# Patient Record
Sex: MALE | Race: WHITE | NOT HISPANIC OR LATINO | Employment: FULL TIME | URBAN - METROPOLITAN AREA
[De-identification: names, ages, dates, MRNs, and addresses within clinical notes are randomized per-mention and may not be internally consistent; named-entity substitution may affect disease eponyms.]

---

## 2018-12-04 ENCOUNTER — OFFICE VISIT CONVERTED (OUTPATIENT)
Dept: FAMILY MEDICINE CLINIC | Facility: CLINIC | Age: 18
End: 2018-12-04
Attending: NURSE PRACTITIONER

## 2019-04-08 ENCOUNTER — HOSPITAL ENCOUNTER (OUTPATIENT)
Dept: URGENT CARE | Facility: CLINIC | Age: 19
Discharge: HOME | End: 2019-04-08

## 2020-03-03 ENCOUNTER — OFFICE VISIT CONVERTED (OUTPATIENT)
Dept: FAMILY MEDICINE CLINIC | Facility: CLINIC | Age: 20
End: 2020-03-03
Attending: NURSE PRACTITIONER

## 2020-07-20 ENCOUNTER — CONVERSION ENCOUNTER (OUTPATIENT)
Dept: FAMILY MEDICINE CLINIC | Facility: CLINIC | Age: 20
End: 2020-07-20

## 2020-07-20 ENCOUNTER — OFFICE VISIT CONVERTED (OUTPATIENT)
Dept: FAMILY MEDICINE CLINIC | Facility: CLINIC | Age: 20
End: 2020-07-20
Attending: NURSE PRACTITIONER

## 2020-08-10 ENCOUNTER — OFFICE VISIT CONVERTED (OUTPATIENT)
Dept: GASTROENTEROLOGY | Facility: CLINIC | Age: 20
End: 2020-08-10
Attending: NURSE PRACTITIONER

## 2020-08-27 ENCOUNTER — HOSPITAL ENCOUNTER (OUTPATIENT)
Dept: PREADMISSION TESTING | Facility: HOSPITAL | Age: 20
Discharge: HOME | End: 2020-08-27
Attending: INTERNAL MEDICINE

## 2020-08-28 LAB — SARS-COV-2 RNA SPEC QL NAA+PROBE: NOT DETECTED

## 2020-08-31 ENCOUNTER — HOSPITAL ENCOUNTER (OUTPATIENT)
Dept: GASTROENTEROLOGY | Facility: HOSPITAL | Age: 20
Setting detail: HOSPITAL OUTPATIENT SURGERY
Discharge: HOME | End: 2020-08-31
Attending: INTERNAL MEDICINE

## 2020-12-29 ENCOUNTER — OFFICE VISIT CONVERTED (OUTPATIENT)
Dept: FAMILY MEDICINE CLINIC | Facility: CLINIC | Age: 20
End: 2020-12-29
Attending: NURSE PRACTITIONER

## 2021-05-13 NOTE — PROGRESS NOTES
Progress Note      Patient Name: Alfonso Randolph II   Patient ID: [id number]   Sex: Male   YOB: 2000    Primary Care Provider: Alec CASTRO    Visit Date: July 20, 2020    Provider: MATTHEW Bowman   Location: [facility]   Location Address: [address] [place] [address], [address]  [id number]   Location Phone: [phone]          Chief Complaint  · Follow up office visit within 7 calendar days of discharge from inpatient status (high complexity).  · [facility] - 7/10/20-7/13/20, colitis  · denies nausea, vomiting, rectal bleeding since released home  · slight abdominal cramping, bowel movements returning to normal  · resumed work over weekend  · Dr. Aly 8/10/20      History Of Present Illness  FOLLOW UP OFFICE VISIT WITHIN 7 CALENDAR DAYS OF INPATIENT STATUS (SEVERE COMPLEXITY)  Alfonso Randolph II presents to office for follow up post discharge from inpatient status within 7 calendar days. Patient was contacted within 2 business days via phone conversation. Documentation of that phone contact is present in the patient's electronic chart. Patient was admitted to an inpatient faciliity on 07/10/2020 and discharged on 07/13/2020 due to: lower GI bleed, colitis   Admitting MD: Maxim Roberts MD   His discharge summary has been reviewed and placed in the patient's electronic chart.   Patient's problem list is: Right dorsal wrist ganglion   Patient's outpatient medication list has been reconciled with the medication list from the discharge summary and has been reviewed with the patient. Current medication list is: Carafate 1 gram oral tablet   Alfonso Randolph II is a 20 year old /White male who presents for evaluation and treatment of:       Past Medical History  Disease Name Date Onset Notes   Reflux --  --    Right dorsal wrist ganglion 09/15/2017 --    Seasonal allergies --  --          Past Surgical History  Procedure Name Date Notes   I have had no surgeries --  --           Medication List  Name Date Started Instructions   Carafate 1 gram oral tablet 07/10/2020 take 1 tablet (1 gram) by oral route 4 times per day on an empty stomach 1 hour before meals and at bedtime         Allergy List  Allergen Name Date Reaction Notes   amoxicillin --  --  --    nystatin --  --  --    PENICILLINS --  --  --          Family Medical History  Disease Name Relative/Age Notes   Stroke  --    Heart Disease  --    Diabetes Mellitus, Type II  --    Hodgskin's Lymphoma  --    *No Known Family History  --          Social History  Finding Status Start/Stop Quantity Notes   Alcohol Never --/-- --  --    Alcohol Use Never --/-- --  does not drink   lives with parents --  --/-- --  --    Recreational Drug Use Never --/-- --  no   Single. --  --/-- --  --    Tobacco Current every day --/-- 0.5 PPD current every day smoker, 0.5 pack per day, smoked 1 year   Working --  --/-- --  --          Review of Systems  · Constitutional  o Denies  o : fatigue, night sweats  · Eyes  o Denies  o : double vision, blurred vision  · HENT  o Denies  o : vertigo, recent head injury  · Breasts  o Denies  o : abnormal changes in breast size, additional breast symptoms except as noted in the HPI  · Cardiovascular  o Denies  o : chest pain, irregular heart beats  · Respiratory  o Denies  o : shortness of breath, productive cough  · Gastrointestinal  o Admits  o : feels good, no signs of blood eating fine. has appt with gastro aug 10  o Denies  o : nausea, vomiting,constipation, diarrhea  · Genitourinary  o Denies  o : dysuria, urinary retention  · Integument  o Denies  o : hair growth change, new skin lesions  · Neurologic  o Denies  o : altered mental status, seizures  · Musculoskeletal  o Admits  o : had a bump R wrist but having no probs  o Denies  o : joint swelling, limitation of motion  · Endocrine  o Denies  o : cold intolerance, heat intolerance  · Heme-Lymph  o Denies  o : petechiae, lymph node enlargement or  tenderness  · Allergic-Immunologic  o Denies  o : frequent illnesses      Vitals  Date Time BP Position Site L\R Cuff Size HR RR TEMP (F) WT  HT  BMI kg/m2 BSA m2 O2 Sat HC       07/20/2020 10:08 /64 Sitting    96 - R  98.1 157lbs 2oz 6'   21.31 1.9 98 %          Physical Examination  · Constitutional  o Appearance  o : well-nourished, well developed, alert, in no acute distress  · Head and Face  o Face  o :   § Inspection  § : no facial lesions  · Eyes  o Conjunctivae  o : conjunctivae normal  o Sclerae  o : sclerae white  o Pupils and Irises  o : pupils equal, round, and reactive to light and accommodation bilaterally  o Eyelids/Ocular Adnexae  o : eyelid appearance normal, no exudates present, eye moisture level normal  · Ears, Nose, Mouth and Throat  o Ears  o : external ear auricle normal, otic canal normal, TM with no reddness, effusion, retraction  o Nose  o : external normal, nasal mucosa normal, turbinates normal  o Oral Cavity  o : tongue no lesion, oral mucosa normal  o Throat  o : no erythemia, exudate or lesions  · Neck  o Inspection/Palpation  o : normal appearance, no masses or tenderness, trachea midline, no enlarged cervical or supraclavicular lymphnodes palpated  o Thyroid  o : gland size normal, nontender, no nodules or masses present on palpation, thyroid motion normal during swallowing  · Respiratory  o Respiratory Effort  o : breathing unlabored  o Auscultation of Lungs  o : normal breath sounds throughout  · Cardiovascular  o Heart  o :   § Auscultation of Heart  § : regular rate and rhythm without murmur  o Peripheral Vascular System  o :   § Extremities  § : no edema, no cyanosis, no distal hair loss, normal capillary refill  · Gastrointestinal  o Abdominal Examination  o : abdomen nontender to palpation, normal bowel sounds, tone normal without rigidity or guarding, no masses present, abdomen scaphoid upon supine  · Skin and Subcutaneous Tissue  o General Inspection  o : no rashes or  lesions present, no areas of discoloration  · Neurologic  o Mental Status Examination  o : judgement, insight intact, modd and affect appropriate  o Gait and Station  o : normal gait, able to stand without difficulty          Assessment  · Colitis     558.9/K52.9      Plan  · Orders  o Discharge medications reconciled with the current medication list (1111F) - - 07/20/2020  o ACO-39: Current medications updated and reviewed () - - 07/20/2020  · Medications  o Medications have been Reconciled  o Transition of Care or Provider Policy  · Instructions  o Patient discharge summary has been reviewed and placed in patient's electronic medical record.  o Patient received a phone call from my office within 2 business days of discharge from inpatient status, and documented within the patient's chart.  o Also patient was seen (face to face) for follow up evaluation within 7 calendar days of discharge from inpatient status for a high complexity issue.  o Patient was educated on their diagnosis, treatment and any medication changes while being evaluated today.  o Patient was educated/instructed on their diagnosis, treatment and medications prior to discharge from the clinic today.  · Disposition  o Call or Return if symptoms worsen or persist.            Electronically Signed by: Hilda Alcala, APRN -Author on July 20, 2020 10:35:01 AM

## 2021-05-13 NOTE — PROGRESS NOTES
Progress Note      Patient Name: Alfonso Randolph II   Patient ID: [id number]   Sex: Male   YOB: 2000    Primary Care Provider: Alec CASTRO    Visit Date: August 10, 2020    Provider: MATTHEW Velarde   Location: [facility]   Location Address: [address] [place] [address]  [place]ieta  [id number]   Location Phone: [phone]          Chief Complaint  · [facility] inpatient f/u      History Of Present Illness     20-year-old male is following up after being seen as an inpatient consult at [facility] for acute colitis on 07/11/2020. His stool studies were negative.  His hemoglobin was 13.7 on discharge.  His LFTs were not elevated during his admission.  Patient states since his discharge he has been feeling well and that he feels better than before his admission.  He is having 2-3 bowel movements a day that are soft but not formed.  He denies having rectal bleeding.  He denies abdominal pain, nausea, vomiting, weight loss, and fever.  He is taking an acid reducer that is over-the-counter and is 150 mg.  He denies shortness of air, chest pain, and dizziness.       Past Medical History  Reflux; Right dorsal wrist ganglion; Seasonal allergies         Past Surgical History  I have had no surgeries         Medication List  Carafate 1 gram oral tablet         Allergy List  amoxicillin; nystatin; PENICILLINS       Allergies Reconciled  Family Medical History  Stroke; Heart Disease; Diabetes Mellitus, Type II; Hodgskin's Lymphoma; *No Known Family History         Social History  Alcohol (Never); Alcohol Use (Never); lives with parents; Recreational Drug Use (Never); Single.; Tobacco (Current every day); Working         Review of Systems  · Constitutional  o Denies  o : chills, fever  · Cardiovascular  o Denies  o : chest pain  · Respiratory  o Denies  o : shortness of breath  · Gastrointestinal  o Denies  o : nausea, vomiting, dysphagia  · Endocrine  o Denies  o : weight gain, weight  "loss      Vitals  Date Time BP Position Site L\R Cuff Size HR RR TEMP (F) WT  HT  BMI kg/m2 BSA m2 O2 Sat HC       08/10/2020 02:12 /62 Sitting    75 - R 16  160lbs 0oz 5'  11\" 22.32 1.91 99 %          Physical Examination  · Constitutional  o Appearance  o : Healthy-appearing, awake and alert in no acute distress  · Head and Face  o Head  o : Normocephalic with no worriesome skin lesions  · Eyes  o Vision  o :   § Visual Fields  § : eyes move symmetrical in all directions  o Sclerae  o : sclerae anicteric  · Neck  o Inspection/Palpation  o : Trachea is midline, no adenopathy  · Respiratory  o Respiratory Effort  o : Breathing is unlabored.  o Inspection of Chest  o : normal appearance  o Auscultation of Lungs  o : Chest is clear to auscultation bilaterally.  · Cardiovascular  o Heart  o :   § Auscultation of Heart  § : no murmurs, rubs, or gallops, RRR  o Peripheral Vascular System  o :   § Extremities  § : no cyanosis, clubbing or edema;   · Gastrointestinal  o Abdominal Examination  o : Abdomen is soft, nontender to palpation, with normal active bowel sounds, no appreciable hepatosplenomegaly.              Assessment  · Acute colitis     558.9/K52.9      Plan  · Orders  o Flexible Colonoscopy -Possible risks/complications, benefits, and alternatives to surgical or invasive procedure have been explained to patient and/or legal gaurdian. -Patient has been evaluated and can tolerate anethesia and/or sedation. Risk, benefits, and alternatives to anethesia and/or sedation have been explained to patient and/or legal gaurdian. (77209) - 558.9/K52.9 - 08/10/2020  · Instructions  o 20-year-old male returning for follow-up after being seen as an inpatient consult at [facility] for acute colitis on 07/11/2020. His stool studies were negative. His hemoglobin was 13.7 on discharge. He currently denies having diarrhea and rectal bleeding. As for his over-the-counter acid reducer, I have told him to not take it if it " is Zantac or ranitidine as those have been recalled. We will schedule him for a colonoscopy per Dr. Aly's orders at the end of the month. I have instructed to the patient the COVID testing will be required prior to procedure. Patient is agreeable plan.  o Electronically Identified Patient Education Materials Provided Electronically            Electronically Signed by: MATTHEW Velarde -Author on August 10, 2020 02:31:54 PM  Electronically Co-signed by: Jerry Aly MD -Reviewer on August 11, 2020 10:06:57 AM

## 2021-05-14 VITALS
HEART RATE: 91 BPM | TEMPERATURE: 98.2 F | HEIGHT: 71 IN | BODY MASS INDEX: 21.98 KG/M2 | SYSTOLIC BLOOD PRESSURE: 123 MMHG | OXYGEN SATURATION: 97 % | WEIGHT: 157 LBS | DIASTOLIC BLOOD PRESSURE: 64 MMHG

## 2021-05-14 NOTE — PROGRESS NOTES
"   Progress Note      Patient Name: Alfonso Randolph II   Patient ID: [id number]   Sex: Male   YOB: 2000    Primary Care Provider: Alec CASTRO    Visit Date: December 29, 2020    Provider: MATTHEW Ty   Location: [facility]   Location Address: [address] [place] [address], [address] [place] [address]  [id number]   Location Phone: [phone]          Chief Complaint  · Wants to talk about depression and anxiety       History Of Present Illness  Alfonso Randolph II is a 20 year old /White male who presents for evaluation and treatment of:      She presents to the office today at the recommendation of his therapist.  Patient has been seeing the same therapist since he was 6 years old and the therapist had concerns of him having anxiety and major depressive disorder.  Patient does state that he worries a lot about what others think and he does not want to burden them.  He does also state that he has become more quiet and reserved and sensitive to different topics whereas before he was the person who makes conversation.  Patient does currently in a relationship and states that he has been dating a girl for 6 months.  He states that things are going well with this although he has to take breaks for 2 to 3 days when he has these \"episodes\".  Patient denies any suicidal homicidal ideations at this time.  Patient does currently live with his parents and they stated that they have noticed him acting more quiet down.  Patient does continue to enjoy dorcas with his dad.  Patient thinks that the symptoms have definitely exacerbated over this past year due to a lot of the isolation requirements.  We did discuss medication therapy possible side effects and the benefits.       Past Medical History  Disease Name Date Onset Notes   Reflux --  --    Right dorsal wrist ganglion 09/15/2017 --    Seasonal allergies --  --          Past Surgical History  Procedure Name Date Notes   Colonoscopy " "2020 --          Allergy List  Allergen Name Date Reaction Notes   amoxicillin --  --  --    nystatin --  --  --    PENICILLINS --  --  --          Family Medical History  Disease Name Relative/Age Notes   Stroke  --    Heart Disease  --    Diabetes Mellitus, Type II  --    Hodgskin's Lymphoma  --    *No Known Family History  --          Social History  Finding Status Start/Stop Quantity Notes   Alcohol Never --/-- --  --    Alcohol Use Never --/-- --  does not drink   lives with parents --  --/-- --  --    Recreational Drug Use Never --/-- --  no   Single. --  --/-- --  --    Tobacco Current every day --/-- 0.5 PPD current every day smoker, 0.5 pack per day, smoked 1 year   Working --  --/-- --  --          Review of Systems  · Constitutional  o Denies  o : fatigue, night sweats  · Eyes  o Denies  o : double vision, blurred vision  · HENT  o Denies  o : vertigo, recent head injury  · Breasts  o Denies  o : abnormal changes in breast size, additional breast symptoms except as noted in the HPI  · Cardiovascular  o Denies  o : chest pain, irregular heart beats  · Respiratory  o Denies  o : shortness of breath, productive cough  · Gastrointestinal  o Denies  o : nausea, vomiting  · Genitourinary  o Denies  o : dysuria, urinary retention  · Integument  o Denies  o : hair growth change, new skin lesions  · Neurologic  o Denies  o : altered mental status, seizures  · Musculoskeletal  o Denies  o : joint swelling, limitation of motion  · Endocrine  o Denies  o : cold intolerance, heat intolerance  · Psychiatric  o Admits  o : anxiety, depression  · Heme-Lymph  o Denies  o : petechiae, lymph node enlargement or tenderness  · Allergic-Immunologic  o Denies  o : frequent illnesses      Vitals  Date Time BP Position Site L\R Cuff Size HR RR TEMP (F) WT  HT  BMI kg/m2 BSA m2 O2 Sat FR L/min FiO2        12/29/2020 01:11 /64 Sitting    91 - R  98.2 156lbs 16oz 5'  11\" 21.9 1.89 97 %            Physical " Examination  · Constitutional  o Appearance  o : well-nourished, in no acute distress  · Neck  o Inspection/Palpation  o : normal appearance, no masses or tenderness, trachea midline  o Range of Motion  o : cervical range of motion within normal limits  o Thyroid  o : gland size normal, nontender, no nodules or masses present on palpation  · Respiratory  o Respiratory Effort  o : breathing unlabored  o Inspection of Chest  o : normal appearance  o Auscultation of Lungs  o : normal breath sounds throughout inspiration and expiration  · Cardiovascular  o Heart  o :   § Auscultation of Heart  § : regular rate and rhythm, no murmurs, gallops or rubs  o Peripheral Vascular System  o :   § Extremities  § : no clubbing or edema  · Skin and Subcutaneous Tissue  o General Inspection  o : no rashes or lesions present, no areas of discoloration  o Body Hair  o : hair normal for age, general body hair distribution normal for age  o Digits and Nails  o : no clubbing, cyanosis, deformities or edema present, normal appearing nails  · Neurologic  o Mental Status Examination  o :   § Orientation  § : grossly oriented to person, place and time  o Gait and Station  o : normal gait, able to stand without difficulty  · Psychiatric  o Judgement and Insight  o : judgment and insight intact  o Mood and Affect  o : mood normal, affect appropriate  o Presence of Abnormal Thoughts  o : no hallucinations, no delusions present, no psychotic thoughts          Assessment  · Screening for depression     V79.0/Z13.89  · Anxiety disorder     300.00/F41.9  · Major depressive disorder     296.20/F32.2      Plan  · Orders  o Annual depression screening, 15 minutes (99217, ) - V79.0/Z13.89 - 12/29/2020  o ACO-18: Positive screen for clinical depression using a standardized tool and a follow-up plan documented () - V79.0/Z13.89 - 12/29/2020  o ACO-17: Screened for tobacco use AND received tobacco cessation intervention (0512M) - -  "12/29/2020  o ACO-39: Current medications updated and reviewed (1159F, ) - - 12/29/2020  o ACO-18: Positive screen for clinical depression using a standardized tool and a follow-up plan documented () - - 12/29/2020  · Medications  o citalopram 20 mg oral tablet   SIG: take 1 tablet (20 mg) by oral route once daily   DISP: (30) Tablet with 2 refills  Prescribed on 12/29/2020     o Medications have been Reconciled  o Transition of Care or Provider Policy  · Instructions  o Depression Screen completed and scanned into the EMR under the designated folder within the patient's documents.  o Today's PHQ-9 result is _16__  o Patient agrees to a \"No Self Harm\" contract. Patient will either call , [facility], ER, [facility], [facility].  o The patient and I discussed the need for therapy, either with a certified counselor, psychologist, and/or family . If no improvement is noted or worsening of their condition, return to office or ER. But also discussed with patient that if they are non-responsive to the type of medication they may need to see a psychiatrist for further evaluation and management.   o Patient was given an SSRI/SSNRI medication and warned of possible side effects of the medication including potential for increase risk of suicidal thoughts and feelings. Patient was instructed that if they begin to exhibit any of these effects they will discontinue the medication immediately and contact our office or the ER ASAP.   o Patient was educated/instructed on their diagnosis, treatment and medications prior to discharge from the clinic today.  o Minutes spent with patient including greater than 50% in Education/Counseling/Care Coordination.  o Time spent with the patient was minutes, more than 50% face to face.  o Electronically Identified Patient Education Materials Provided Electronically  · Disposition  o Call or Return if symptoms worsen or persist.  o Follow up in 3 " months            Electronically Signed by: MATTHEW Ty -Author on December 29, 2020 01:26:22 PM

## 2021-05-15 VITALS
BODY MASS INDEX: 21.28 KG/M2 | OXYGEN SATURATION: 98 % | DIASTOLIC BLOOD PRESSURE: 64 MMHG | SYSTOLIC BLOOD PRESSURE: 116 MMHG | WEIGHT: 157.12 LBS | TEMPERATURE: 98.1 F | HEIGHT: 72 IN | HEART RATE: 96 BPM

## 2021-05-15 VITALS
HEART RATE: 75 BPM | SYSTOLIC BLOOD PRESSURE: 119 MMHG | HEIGHT: 71 IN | BODY MASS INDEX: 22.4 KG/M2 | DIASTOLIC BLOOD PRESSURE: 62 MMHG | RESPIRATION RATE: 16 BRPM | WEIGHT: 160 LBS | OXYGEN SATURATION: 99 %

## 2021-05-15 VITALS
DIASTOLIC BLOOD PRESSURE: 52 MMHG | HEART RATE: 92 BPM | RESPIRATION RATE: 19 BRPM | SYSTOLIC BLOOD PRESSURE: 126 MMHG | TEMPERATURE: 98.1 F | HEIGHT: 72 IN | OXYGEN SATURATION: 99 % | BODY MASS INDEX: 22.08 KG/M2 | WEIGHT: 163.06 LBS

## 2021-05-16 VITALS
RESPIRATION RATE: 18 BRPM | SYSTOLIC BLOOD PRESSURE: 120 MMHG | HEIGHT: 72 IN | OXYGEN SATURATION: 100 % | TEMPERATURE: 98.3 F | BODY MASS INDEX: 20.72 KG/M2 | HEART RATE: 90 BPM | DIASTOLIC BLOOD PRESSURE: 83 MMHG | WEIGHT: 153 LBS

## 2021-07-13 ENCOUNTER — HOSPITAL ENCOUNTER (EMERGENCY)
Facility: HOSPITAL | Age: 21
Discharge: HOME | End: 2021-07-14
Attending: EMERGENCY MEDICINE | Admitting: EMERGENCY MEDICINE

## 2021-07-13 DIAGNOSIS — K08.89 PAIN, DENTAL: Primary | ICD-10-CM

## 2021-07-13 PROCEDURE — 99283 EMERGENCY DEPT VISIT LOW MDM: CPT

## 2021-07-13 PROCEDURE — 99282 EMERGENCY DEPT VISIT SF MDM: CPT

## 2021-07-14 VITALS
WEIGHT: 154.76 LBS | OXYGEN SATURATION: 98 % | HEART RATE: 68 BPM | HEIGHT: 71 IN | DIASTOLIC BLOOD PRESSURE: 70 MMHG | BODY MASS INDEX: 21.67 KG/M2 | RESPIRATION RATE: 16 BRPM | TEMPERATURE: 98.9 F | SYSTOLIC BLOOD PRESSURE: 120 MMHG

## 2021-07-14 RX ORDER — TRAMADOL HYDROCHLORIDE 50 MG/1
50 TABLET ORAL ONCE
Status: COMPLETED | OUTPATIENT
Start: 2021-07-14 | End: 2021-07-14

## 2021-07-14 RX ORDER — ACETAMINOPHEN 160 MG/5ML
650 SOLUTION ORAL ONCE
Status: COMPLETED | OUTPATIENT
Start: 2021-07-14 | End: 2021-07-14

## 2021-07-14 RX ORDER — CITALOPRAM 20 MG/1
20 TABLET ORAL DAILY
COMMUNITY
Start: 2021-03-22 | End: 2021-07-19 | Stop reason: SDUPTHER

## 2021-07-14 RX ORDER — CLINDAMYCIN HYDROCHLORIDE 300 MG/1
300 CAPSULE ORAL 4 TIMES DAILY
Qty: 40 CAPSULE | Refills: 0 | Status: SHIPPED | OUTPATIENT
Start: 2021-07-14 | End: 2021-07-24

## 2021-07-14 RX ORDER — LIDOCAINE HYDROCHLORIDE 20 MG/ML
10 SOLUTION OROPHARYNGEAL AS NEEDED
Qty: 100 ML | Refills: 0 | Status: SHIPPED | OUTPATIENT
Start: 2021-07-14 | End: 2022-10-12

## 2021-07-14 RX ORDER — DIFLUNISAL 500 MG/1
500 TABLET, FILM COATED ORAL 2 TIMES DAILY
Qty: 60 TABLET | Refills: 0 | Status: SHIPPED | OUTPATIENT
Start: 2021-07-14 | End: 2022-10-12

## 2021-07-14 RX ORDER — LIDOCAINE HYDROCHLORIDE 20 MG/ML
10 SOLUTION OROPHARYNGEAL ONCE
Status: COMPLETED | OUTPATIENT
Start: 2021-07-14 | End: 2021-07-14

## 2021-07-14 RX ADMIN — ACETAMINOPHEN 649.6 MG: 160 SOLUTION ORAL at 01:09

## 2021-07-14 RX ADMIN — LIDOCAINE HYDROCHLORIDE 10 ML: 20 SOLUTION ORAL; TOPICAL at 01:10

## 2021-07-14 RX ADMIN — TRAMADOL HYDROCHLORIDE 50 MG: 50 TABLET, FILM COATED ORAL at 01:08

## 2021-07-14 RX ADMIN — BENZOCAINE 2 SPRAY: 200 SPRAY DENTAL; ORAL; PERIODONTAL at 01:10

## 2021-07-14 NOTE — ED PROVIDER NOTES
Time: 00:31 EDT  Arrived by: Private vehicle  Chief Complaint: Dental pain  History provided by: Patient  History is limited by: N/A    History of Present Illness:  Patient is a 21 y.o. year old male that presents to the emergency department with right-sided dental pain from a cracked tooth that has been broken for over a month but in the past week he has started to hurt and swell      History provided by:  Patient  Dental Pain  Location:  Lower  Lower teeth location:  31/RL 2nd molar  Quality:  Aching, constant and throbbing  Severity:  Moderate  Onset quality:  Gradual  Duration:  7 days  Timing:  Constant  Progression:  Worsening  Chronicity:  Recurrent  Context: dental caries and dental fracture ( Piece of tooth broke off of it a month ago)    Previous work-up:  Dental exam  Relieved by:  Nothing  Worsened by:  Nothing  Ineffective treatments:  Acetaminophen and NSAIDs  Associated symptoms: facial swelling and gum swelling    Associated symptoms: no congestion, no difficulty swallowing, no drooling, no facial pain, no fever, no headaches, no neck swelling, no oral bleeding, no oral lesions and no trismus            Similar Symptoms Previously: Yes got seen when it was broken and was told he needed a root canal but he cannot afford it  Recently seen: Saw his dentist      Patient Care Team  Primary Care Provider: Alec Chung    Past Medical History:     Allergies   Allergen Reactions   • Nystatin Unknown - Low Severity   • Penicillins Rash     Past Medical History:   Diagnosis Date   • Acid reflux    • Dorsal wrist ganglion 9/15/78206    RIGHT   • Seasonal allergies      Past Surgical History:   Procedure Laterality Date   • COLONOSCOPY  2020     Family History   Family history unknown: Yes       Home Medications:  Prior to Admission medications    Not on File        Social History:   PT  reports that he has been smoking cigarettes. He has been smoking about 0.50 packs per day. He does not have any smokeless  "tobacco history on file. He reports that he does not drink alcohol and does not use drugs.    Record Review:  I have reviewed the patient's records in [facility].     Review of Systems  Review of Systems   Constitutional: Negative for chills and fever.   HENT: Positive for dental problem and facial swelling. Negative for congestion, drooling, ear pain, mouth sores and sore throat.    Eyes: Negative for pain.   Respiratory: Negative for cough, chest tightness and shortness of breath.    Cardiovascular: Negative for chest pain.   Gastrointestinal: Negative for abdominal pain, diarrhea, nausea and vomiting.   Genitourinary: Negative for flank pain and hematuria.   Musculoskeletal: Negative for joint swelling.   Skin: Negative for pallor.   Neurological: Negative for seizures and headaches.   Hematological: Negative.    Psychiatric/Behavioral: Negative.    All other systems reviewed and are negative.       Physical Exam  /63   Pulse 88   Temp 98.9 °F (37.2 °C)   Resp 16   Ht 180.3 cm (71\")   Wt 70.2 kg (154 lb 12.2 oz)   SpO2 97%   BMI 21.59 kg/m²     Physical Exam  Vitals and nursing note reviewed.   Constitutional:       General: He is not in acute distress.     Appearance: Normal appearance. He is not toxic-appearing.   HENT:      Head: Normocephalic and atraumatic.      Jaw: Tenderness ( Right lower jaw) and swelling ( Mild right mandible) present. No trismus.      Right Ear: Tympanic membrane normal.      Left Ear: Tympanic membrane normal.      Nose: Nose normal.      Mouth/Throat:      Mouth: Mucous membranes are moist.      Dentition: Dental tenderness and dental caries present. No dental abscesses.   Eyes:      Extraocular Movements: Extraocular movements intact.      Pupils: Pupils are equal, round, and reactive to light.   Cardiovascular:      Rate and Rhythm: Normal rate and regular rhythm.      Pulses: Normal pulses.      Heart sounds: Normal heart sounds.   Pulmonary:      Effort: Pulmonary effort " "is normal. No respiratory distress.      Breath sounds: Normal breath sounds.   Abdominal:      General: Abdomen is flat.      Palpations: Abdomen is soft.      Tenderness: There is no abdominal tenderness.   Musculoskeletal:         General: Normal range of motion.      Cervical back: Normal range of motion and neck supple.   Skin:     General: Skin is warm and dry.   Neurological:      Mental Status: He is alert and oriented to person, place, and time. Mental status is at baseline.                  ED Course  /63   Pulse 88   Temp 98.9 °F (37.2 °C)   Resp 16   Ht 180.3 cm (71\")   Wt 70.2 kg (154 lb 12.2 oz)   SpO2 97%   BMI 21.59 kg/m²   No results found for this or any previous visit.  Medications   traMADol (ULTRAM) tablet 50 mg (has no administration in time range)   Lidocaine Viscous HCl (XYLOCAINE) 2 % mouth solution 10 mL (has no administration in time range)   acetaminophen (TYLENOL) 160 MG/5ML solution 650 mg (has no administration in time range)   Benzocaine 20 % aerosol 2 spray (has no administration in time range)     No results found.          Medical Decision Making:                     MDM  Number of Diagnoses or Management Options  Pain, dental  Diagnosis management comments: I have spoken with the patient. I have explained the patient´s condition, diagnoses and treatment plan based on the information available to me at this time. I have answered the  patient's questions and addressed any concerns. The patient has a good  understanding of the patient´s diagnosis, condition, and treatment plan as can be expected at this point. The vital signs have been stable. The patient´s condition is stable and appropriate for discharge from the emergency department.      The patient will pursue further outpatient evaluation with the primary care physician or other designated or consulting physician as outlined in the discharge instructions. They are agreeable to this plan of care and follow-up " instructions have been explained in detail. The patient has received these instructions in written format and have expressed an understanding of the discharge instructions. The patient is aware that any significant change in condition or worsening of symptoms should prompt an immediate return to this or the closest emergency department or call to 911.         Amount and/or Complexity of Data Reviewed  Tests in the medicine section of CPT®: ordered and reviewed    Risk of Complications, Morbidity, and/or Mortality  Presenting problems: minimal  Management options: minimal    Patient Progress  Patient progress: stable       Final diagnoses:   Pain, dental        Disposition:  ED Disposition     ED Disposition Condition Comment    Discharge Stable              Nelli Mendoza, MATTHEW  07/14/21 0032

## 2021-07-14 NOTE — DISCHARGE INSTRUCTIONS
Mix viscous lidocaine with 650 mg liquid tylenol and make dental balls to apply to affected tooth as needed for pain    Oral rinses with half strength peroxide or warm salt water 3-4 times/ day    Follow up with dentist of choice or  of [facility]

## 2021-07-19 ENCOUNTER — OFFICE VISIT (OUTPATIENT)
Dept: FAMILY MEDICINE CLINIC | Facility: CLINIC | Age: 21
End: 2021-07-19

## 2021-07-19 VITALS
OXYGEN SATURATION: 99 % | SYSTOLIC BLOOD PRESSURE: 124 MMHG | WEIGHT: 152 LBS | HEIGHT: 71 IN | HEART RATE: 88 BPM | DIASTOLIC BLOOD PRESSURE: 70 MMHG | BODY MASS INDEX: 21.28 KG/M2 | TEMPERATURE: 98.4 F

## 2021-07-19 DIAGNOSIS — R59.0 LYMPHADENOPATHY, INGUINAL: ICD-10-CM

## 2021-07-19 DIAGNOSIS — F33.0 MAJOR DEPRESSIVE DISORDER, RECURRENT, MILD (HCC): Primary | ICD-10-CM

## 2021-07-19 DIAGNOSIS — R11.0 NAUSEA: ICD-10-CM

## 2021-07-19 PROBLEM — J30.2 SEASONAL ALLERGIC RHINITIS: Status: ACTIVE | Noted: 2021-07-19

## 2021-07-19 PROBLEM — K21.9 ESOPHAGEAL REFLUX: Status: ACTIVE | Noted: 2021-07-19

## 2021-07-19 PROCEDURE — 99213 OFFICE O/P EST LOW 20 MIN: CPT | Performed by: NURSE PRACTITIONER

## 2021-07-19 RX ORDER — CITALOPRAM 20 MG/1
40 TABLET ORAL DAILY
Qty: 90 TABLET | Refills: 1 | Status: SHIPPED | OUTPATIENT
Start: 2021-07-19 | End: 2022-06-05 | Stop reason: SDUPTHER

## 2021-07-19 RX ORDER — ONDANSETRON 4 MG/1
4 TABLET, FILM COATED ORAL EVERY 8 HOURS PRN
Qty: 20 TABLET | Refills: 0 | Status: SHIPPED | OUTPATIENT
Start: 2021-07-19 | End: 2021-08-23

## 2021-07-19 NOTE — PROGRESS NOTES
"Chief Complaint  Follow-up (Depression meds) and Groin Pain (with swelling )    Subjective          Alfonso Randolph II presents to [facility]  Presents to the office today to discuss his medication. Patient states that his citalopram 20 mg is working very well for him but over the past few months he has had things happen within his family that has increased his depression and anxiety. Patient is inquiring if we can increase his citalopram to 40 mg.    Patient also complains of swelling to his left groin. He states that he has been sick for the last 3 to 4 days general body aches and low-grade fevers. Patient is supposed to be on clindamycin for an abscessed tooth. He states he has not been taking this due to him not feeling well and having nausea. I explained to the patient we would treat his nausea so he was able to eat and take his medication.      Objective   Vital Signs:   /70 (BP Location: Left arm, Patient Position: Sitting, Cuff Size: Adult)   Pulse 88   Temp 98.4 °F (36.9 °C) (Temporal)   Ht 180.3 cm (71\")   Wt 68.9 kg (152 lb)   SpO2 99%   BMI 21.20 kg/m²     Physical Exam  Vitals reviewed.   Constitutional:       Appearance: Normal appearance.   Cardiovascular:      Rate and Rhythm: Normal rate and regular rhythm.      Heart sounds: Normal heart sounds, S1 normal and S2 normal. No murmur heard.     Pulmonary:      Effort: Pulmonary effort is normal. No respiratory distress.      Breath sounds: Normal breath sounds.   Lymphadenopathy:      Lower Body: Left inguinal adenopathy present.   Skin:     General: Skin is warm and dry.   Neurological:      Mental Status: He is alert and oriented to person, place, and time.   Psychiatric:         Attention and Perception: Attention normal.         Mood and Affect: Mood normal.         Behavior: Behavior normal.        Result Review :                Assessment and Plan    Diagnoses and all orders for this visit:    1. Major " depressive disorder, recurrent, mild (CMS/HCC) (Primary)  -     citalopram (CeleXA) 20 MG tablet; Take 2 tablets by mouth Daily.  Dispense: 90 tablet; Refill: 1    2. Nausea  -     ondansetron (Zofran) 4 MG tablet; Take 1 tablet by mouth Every 8 (Eight) Hours As Needed for Nausea or Vomiting.  Dispense: 20 tablet; Refill: 0    3. Lymphadenopathy, inguinal  Comments:  Continue clindamycin and finish as prescribed.        Follow Up   Return in about 2 weeks (around 8/2/2021), or if symptoms worsen or fail to improve. I have asked the patient to contact the office if there is no improvement in his lymphadenopathy. I explained that we would obtain lab work and ultrasound. She verbalized understanding  Patient was given instructions and counseling regarding his condition or for health maintenance advice. Please see specific information pulled into the AVS if appropriate.

## 2021-08-21 DIAGNOSIS — R11.0 NAUSEA: ICD-10-CM

## 2021-08-23 RX ORDER — ONDANSETRON 4 MG/1
TABLET, FILM COATED ORAL
Qty: 20 TABLET | Refills: 0 | Status: SHIPPED | OUTPATIENT
Start: 2021-08-23

## 2022-01-08 ENCOUNTER — HOSPITAL ENCOUNTER (EMERGENCY)
Facility: HOSPITAL | Age: 22
Discharge: HOME | End: 2022-01-08
Attending: EMERGENCY MEDICINE | Admitting: EMERGENCY MEDICINE

## 2022-01-08 ENCOUNTER — APPOINTMENT (OUTPATIENT)
Dept: CT IMAGING | Facility: HOSPITAL | Age: 22
End: 2022-01-08

## 2022-01-08 ENCOUNTER — APPOINTMENT (OUTPATIENT)
Dept: GENERAL RADIOLOGY | Facility: HOSPITAL | Age: 22
End: 2022-01-08

## 2022-01-08 VITALS
OXYGEN SATURATION: 100 % | RESPIRATION RATE: 16 BRPM | SYSTOLIC BLOOD PRESSURE: 118 MMHG | DIASTOLIC BLOOD PRESSURE: 72 MMHG | BODY MASS INDEX: 23.61 KG/M2 | HEART RATE: 72 BPM | HEIGHT: 71 IN | WEIGHT: 168.65 LBS | TEMPERATURE: 100 F

## 2022-01-08 DIAGNOSIS — S83.91XA SPRAIN OF RIGHT KNEE, UNSPECIFIED LIGAMENT, INITIAL ENCOUNTER: ICD-10-CM

## 2022-01-08 DIAGNOSIS — R55 SYNCOPE, UNSPECIFIED SYNCOPE TYPE: Primary | ICD-10-CM

## 2022-01-08 LAB
ALBUMIN SERPL-MCNC: 4.6 G/DL (ref 3.5–5.2)
ALBUMIN/GLOB SERPL: 2 G/DL
ALP SERPL-CCNC: 55 U/L (ref 39–117)
ALT SERPL W P-5'-P-CCNC: 8 U/L (ref 1–41)
ANION GAP SERPL CALCULATED.3IONS-SCNC: 11.2 MMOL/L (ref 5–15)
AST SERPL-CCNC: 14 U/L (ref 1–40)
BASOPHILS # BLD AUTO: 0.08 10*3/MM3 (ref 0–0.2)
BASOPHILS NFR BLD AUTO: 1.2 % (ref 0–1.5)
BILIRUB SERPL-MCNC: 0.3 MG/DL (ref 0–1.2)
BUN SERPL-MCNC: 7 MG/DL (ref 6–20)
BUN/CREAT SERPL: 7.3 (ref 7–25)
CALCIUM SPEC-SCNC: 9.6 MG/DL (ref 8.6–10.5)
CHLORIDE SERPL-SCNC: 103 MMOL/L (ref 98–107)
CO2 SERPL-SCNC: 28.8 MMOL/L (ref 22–29)
CREAT SERPL-MCNC: 0.96 MG/DL (ref 0.76–1.27)
DEPRECATED RDW RBC AUTO: 41.6 FL (ref 37–54)
EOSINOPHIL # BLD AUTO: 0.18 10*3/MM3 (ref 0–0.4)
EOSINOPHIL NFR BLD AUTO: 2.6 % (ref 0.3–6.2)
ERYTHROCYTE [DISTWIDTH] IN BLOOD BY AUTOMATED COUNT: 12.6 % (ref 12.3–15.4)
GFR SERPL CREATININE-BSD FRML MDRD: 96 ML/MIN/1.73
GLOBULIN UR ELPH-MCNC: 2.3 GM/DL
GLUCOSE SERPL-MCNC: 104 MG/DL (ref 65–99)
HCT VFR BLD AUTO: 42.1 % (ref 37.5–51)
HGB BLD-MCNC: 14.7 G/DL (ref 13–17.7)
HOLD SPECIMEN: NORMAL
HOLD SPECIMEN: NORMAL
IMM GRANULOCYTES # BLD AUTO: 0.01 10*3/MM3 (ref 0–0.05)
IMM GRANULOCYTES NFR BLD AUTO: 0.1 % (ref 0–0.5)
LYMPHOCYTES # BLD AUTO: 2.42 10*3/MM3 (ref 0.7–3.1)
LYMPHOCYTES NFR BLD AUTO: 35.2 % (ref 19.6–45.3)
MCH RBC QN AUTO: 32 PG (ref 26.6–33)
MCHC RBC AUTO-ENTMCNC: 34.9 G/DL (ref 31.5–35.7)
MCV RBC AUTO: 91.7 FL (ref 79–97)
MONOCYTES # BLD AUTO: 0.36 10*3/MM3 (ref 0.1–0.9)
MONOCYTES NFR BLD AUTO: 5.2 % (ref 5–12)
NEUTROPHILS NFR BLD AUTO: 3.82 10*3/MM3 (ref 1.7–7)
NEUTROPHILS NFR BLD AUTO: 55.7 % (ref 42.7–76)
NRBC BLD AUTO-RTO: 0 /100 WBC (ref 0–0.2)
PLATELET # BLD AUTO: 170 10*3/MM3 (ref 140–450)
PMV BLD AUTO: 12.6 FL (ref 6–12)
POTASSIUM SERPL-SCNC: 3.5 MMOL/L (ref 3.5–5.2)
PROT SERPL-MCNC: 6.9 G/DL (ref 6–8.5)
QT INTERVAL: 345 MS
RBC # BLD AUTO: 4.59 10*6/MM3 (ref 4.14–5.8)
SODIUM SERPL-SCNC: 143 MMOL/L (ref 136–145)
WBC NRBC COR # BLD: 6.87 10*3/MM3 (ref 3.4–10.8)
WHOLE BLOOD HOLD SPECIMEN: NORMAL
WHOLE BLOOD HOLD SPECIMEN: NORMAL

## 2022-01-08 PROCEDURE — 85025 COMPLETE CBC W/AUTO DIFF WBC: CPT | Performed by: EMERGENCY MEDICINE

## 2022-01-08 PROCEDURE — 99283 EMERGENCY DEPT VISIT LOW MDM: CPT

## 2022-01-08 PROCEDURE — 73562 X-RAY EXAM OF KNEE 3: CPT

## 2022-01-08 PROCEDURE — 93010 ELECTROCARDIOGRAM REPORT: CPT | Performed by: INTERNAL MEDICINE

## 2022-01-08 PROCEDURE — 36415 COLL VENOUS BLD VENIPUNCTURE: CPT

## 2022-01-08 PROCEDURE — 96374 THER/PROPH/DIAG INJ IV PUSH: CPT

## 2022-01-08 PROCEDURE — 70450 CT HEAD/BRAIN W/O DYE: CPT

## 2022-01-08 PROCEDURE — 80053 COMPREHEN METABOLIC PANEL: CPT | Performed by: EMERGENCY MEDICINE

## 2022-01-08 PROCEDURE — 99284 EMERGENCY DEPT VISIT MOD MDM: CPT

## 2022-01-08 PROCEDURE — 25010000002 KETOROLAC TROMETHAMINE PER 15 MG: Performed by: EMERGENCY MEDICINE

## 2022-01-08 PROCEDURE — 93005 ELECTROCARDIOGRAM TRACING: CPT | Performed by: EMERGENCY MEDICINE

## 2022-01-08 RX ORDER — SODIUM CHLORIDE 0.9 % (FLUSH) 0.9 %
10 SYRINGE (ML) INJECTION AS NEEDED
Status: DISCONTINUED | OUTPATIENT
Start: 2022-01-08 | End: 2022-01-08 | Stop reason: HOSPADM

## 2022-01-08 RX ORDER — KETOROLAC TROMETHAMINE 30 MG/ML
30 INJECTION, SOLUTION INTRAMUSCULAR; INTRAVENOUS ONCE
Status: COMPLETED | OUTPATIENT
Start: 2022-01-08 | End: 2022-01-08

## 2022-01-08 RX ADMIN — KETOROLAC TROMETHAMINE 30 MG: 30 INJECTION, SOLUTION INTRAMUSCULAR; INTRAVENOUS at 01:02

## 2022-01-08 RX ADMIN — SODIUM CHLORIDE 1000 ML: 9 INJECTION, SOLUTION INTRAVENOUS at 01:02

## 2022-01-08 NOTE — ED PROVIDER NOTES
"Time: 12:47 AM EST  Arrived by: Private car  Chief Complaint: Syncope    History of Present Illness:    Alfonso Randolph II is a 22 y.o. male who presents to the emergency department today with complaints of syncope. The patient reports that he has a history of syncopal episodes which he attributes to dehydration. He states that he was feeling fine earlier today with no nausea or other unusual symptoms. However, he notes that when he ambulated to the restroom this evening, he began to feel \"a little hot\" and believes that he lost consciousness as he was sitting down. Per nursing, his syncopal episode lasted for approximately 20 seconds in total.    Upon waking, the patient reports that he immediately noticed a significant burning pain to his right knee which shoots down to the right ankle. He notes that he did hit his head against a door and that his head is currently \"ringing.\" He adds that there was some mild seizure activity after the incident.    The patient denies any signs of viral illness. He does smoke and drinks alcohol occasionally, but denies drug use. He does report some mild alcohol use this evening. There are no other acute complaints at this time.      History provided by:  Patient   used: No    Syncope  Episode history:  Single  Most recent episode:  Today  Duration:  20 seconds  Timing:  Constant  Progression:  Improving  Chronicity:  New  Relieved by:  None tried  Worsened by:  Nothing  Ineffective treatments:  None tried  Associated symptoms: seizures    Associated symptoms: no chest pain, no fever, no nausea, no shortness of breath and no vomiting    Risk factors comment:  History of syncopal episodes.      Similar Symptoms Previously: Yes.  Recently seen: Patient was seen in the ED on 7/13/2021 with dental pain.      Patient Care Team  Primary Care Provider: Alec Chung APRN    Past Medical History:     Allergies   Allergen Reactions   • Amoxicillin Rash   • Nystatin Rash   • " Penicillins Rash     Past Medical History:   Diagnosis Date   • Acid reflux    • Acid reflux    • Anxiety    • Depression    • Dorsal wrist ganglion 9/15/29441    RIGHT   • Seasonal allergies      Past Surgical History:   Procedure Laterality Date   • COLONOSCOPY  2020   • COLONOSCOPY       Family History   Problem Relation Age of Onset   • No Known Problems Mother    • No Known Problems Father        Home Medications:  Prior to Admission medications    Medication Sig Start Date End Date Taking? Authorizing Provider   citalopram (CeleXA) 20 MG tablet Take 2 tablets by mouth Daily. 7/19/21   Alec Chung APRN   diflunisal (DOLOBID) 500 MG tablet tablet Take 1 tablet by mouth 2 (Two) Times a Day. 7/14/21   Nelli Mendoza APRN   Lidocaine Viscous HCl (XYLOCAINE) 2 % solution Take 10 mL by mouth As Needed for Mild Pain . 7/14/21   Nelli Mendoza APRN   Omeprazole Magnesium (PRILOSEC OTC PO) Take 10 mg by mouth As Needed.    Provider, MD Miguel   ondansetron (ZOFRAN) 4 MG tablet TAKE 1 TABLET BY MOUTH EVERY 8 HOURS AS NEEDED FOR NAUSEA FOR VOMITING 8/23/21   Alec Chung APRN        Social History:   Social History     Tobacco Use   • Smoking status: Current Every Day Smoker     Packs/day: 0.50     Types: Cigarettes   • Smokeless tobacco: Never Used   Vaping Use   • Vaping Use: Some days   • Substances: Nicotine, Flavoring   • Devices: Disposable   Substance Use Topics   • Alcohol use: Yes     Comment: OCC   • Drug use: Never       Record Review:  I have reviewed the patient's records in [facility].     Review of Systems:  Review of Systems   Constitutional: Negative for chills and fever.   HENT: Negative for nosebleeds.    Eyes: Negative for redness.   Respiratory: Negative for cough and shortness of breath.    Cardiovascular: Positive for syncope. Negative for chest pain.   Gastrointestinal: Negative for diarrhea, nausea and vomiting.   Genitourinary: Negative for dysuria and frequency.   Musculoskeletal: Positive for  "arthralgias (right knee pain with radiation to right ankle). Negative for back pain and neck pain.   Skin: Negative for rash.   Neurological: Positive for seizures and syncope.        \"Head ringing.\" Head injury.   All other systems reviewed and are negative.       Physical Exam:  /72   Pulse 72   Temp 100 °F (37.8 °C) (Oral)   Resp 16   Ht 180.3 cm (71\")   Wt 76.5 kg (168 lb 10.4 oz)   SpO2 100%   BMI 23.52 kg/m²     Physical Exam  Vitals and nursing note reviewed.   Constitutional:       General: He is not in acute distress.  HENT:      Head: Normocephalic.      Comments: Mild tenderness at the occipital scalp.     Nose: Nose normal.      Mouth/Throat:      Mouth: Mucous membranes are moist.   Eyes:      General: No scleral icterus.  Cardiovascular:      Rate and Rhythm: Normal rate and regular rhythm.      Heart sounds: Normal heart sounds. No murmur heard.      Pulmonary:      Effort: No respiratory distress.      Breath sounds: Normal breath sounds.   Abdominal:      Palpations: Abdomen is soft.      Tenderness: There is no abdominal tenderness.   Musculoskeletal:      Cervical back: Normal range of motion and neck supple.      Right lower leg: No edema.      Left lower leg: No edema.      Comments: Tenderness over the medial and lateral right knee.   Skin:     General: Skin is warm and dry.   Neurological:      Mental Status: He is alert. Mental status is at baseline.   Psychiatric:         Behavior: Behavior normal.                Medications in the Emergency Department:  Medications   sodium chloride 0.9 % bolus 1,000 mL (0 mL Intravenous Stopped 1/8/22 0132)   ketorolac (TORADOL) injection 30 mg (30 mg Intravenous Given 1/8/22 0102)        Labs  Lab Results (last 24 hours)     Procedure Component Value Units Date/Time    CBC & Differential [[id number]]  (Abnormal) Collected: 01/08/22 0037    Specimen: Blood Updated: 01/08/22 0045    Narrative:      The following orders were created for panel " order CBC & Differential.  Procedure                               Abnormality         Status                     ---------                               -----------         ------                     CBC Auto Differential[[id number]]        Abnormal            Final result                 Please view results for these tests on the individual orders.    Comprehensive Metabolic Panel [[id number]]  (Abnormal) Collected: 01/08/22 0037    Specimen: Blood Updated: 01/08/22 0103     Glucose 104 mg/dL      BUN 7 mg/dL      Creatinine 0.96 mg/dL      Sodium 143 mmol/L      Potassium 3.5 mmol/L      Chloride 103 mmol/L      CO2 28.8 mmol/L      Calcium 9.6 mg/dL      Total Protein 6.9 g/dL      Albumin 4.60 g/dL      ALT (SGPT) 8 U/L      AST (SGOT) 14 U/L      Alkaline Phosphatase 55 U/L      Total Bilirubin 0.3 mg/dL      eGFR Non African Amer 96 mL/min/1.73      Globulin 2.3 gm/dL      A/G Ratio 2.0 g/dL      BUN/Creatinine Ratio 7.3     Anion Gap 11.2 mmol/L     Narrative:      GFR Normal >60  Chronic Kidney Disease <60  Kidney Failure <15      CBC Auto Differential [[id number]]  (Abnormal) Collected: 01/08/22 0037    Specimen: Blood Updated: 01/08/22 0045     WBC 6.87 10*3/mm3      RBC 4.59 10*6/mm3      Hemoglobin 14.7 g/dL      Hematocrit 42.1 %      MCV 91.7 fL      MCH 32.0 pg      MCHC 34.9 g/dL      RDW 12.6 %      RDW-SD 41.6 fl      MPV 12.6 fL      Platelets 170 10*3/mm3      Neutrophil % 55.7 %      Lymphocyte % 35.2 %      Monocyte % 5.2 %      Eosinophil % 2.6 %      Basophil % 1.2 %      Immature Grans % 0.1 %      Neutrophils, Absolute 3.82 10*3/mm3      Lymphocytes, Absolute 2.42 10*3/mm3      Monocytes, Absolute 0.36 10*3/mm3      Eosinophils, Absolute 0.18 10*3/mm3      Basophils, Absolute 0.08 10*3/mm3      Immature Grans, Absolute 0.01 10*3/mm3      nRBC 0.0 /100 WBC            Imaging:  XR Knee 3 View Right    Result Date: 1/8/2022  PROCEDURE: XR KNEE 3 VW RIGHT  COMPARISON: [facility], CR, KNEE 3 VIEWS  RT, 8/03/2012, 13:45.  INDICATIONS: FALL. RIGHT KNEE PAIN. NON WEIGHT BEARING  FINDINGS: There is no evidence for displaced fracture or dislocation. No definitive joint effusion is observed. No focal osseous abnormalities are seen. The soft tissues are unremarkable.       No evidence for displaced fracture or dislocation.      SYLVESTER SALGADO MD       Electronically Signed and Approved By: SYLVESTER SALGADO MD on 1/08/2022 at 0:45             CT Head Without Contrast    Result Date: 1/8/2022  PROCEDURE: CT HEAD WO CONTRAST  COMPARISON:  [facility], CT, HEAD W/O CONTRAST, 5/28/2020, 22:29. INDICATIONS: SYNCOPE EPISODE. FELL AND HIT HEAD. POSSIBLE SEIZURE ACTIVITY.  PROTOCOL:   Standard imaging protocol performed    RADIATION:   DLP: 1017.2mGy*cm   MA and/or KV was adjusted to minimize radiation dose.     TECHNIQUE: After obtaining the patient's consent, CT images were obtained without non-ionic intravenous contrast material.  FINDINGS:  There is no evidence for acute intracranial hemorrhage. No definitive acute focal ischemia is observed. There is no evidence for abnormal cerebral edema. No mass effect or midline shift is seen. The ventricular system is nondilated. The basilar cisterns are patent. The skull is intact without displaced fracture. The paranasal sinuses and mastoid air cells are clear.        1. No evidence for acute intracranial abnormality.    SYLVESTER SALGADO MD       Electronically Signed and Approved By: SYLVESTER SALGADO MD on 1/08/2022 at 1:25               Procedures:  Procedures     EKG:    Rhythm: Normal sinus rhythm.  Rate: 68.  Normal P waves.  Normal QRS.  Normal ST segment.  Normal QT .    EKG Comparison: No comparison.    Interpreted by me.    Progress                            Medical Decision Making:  MDM     The patient´s CBC was reviewed and shows no abnormalities of critical concern. Of note, there is no anemia requiring a blood transfusion and the platelet count is acceptable.   The patient´s CMP was reviewed and shows no abnormalities of critical concern. Of note, the patient´s sodium and potassium are acceptable. The patient´s liver enzymes are unremarkable. The patient´s renal function (creatinine) is preserved. The patient has a normal anion gap.  CT head shows no acute intracranial process.  X-ray right knee shows no fracture or dislocation.  Patient placed in knee immobilizer and provided crutches.  Follow-up with orthopedics.  Encourage patient follow-up with PCP and/or cardiologist regarding recurrent syncopal episodes.  We discussed return precautions including worsening symptoms or any additional concerns.    Final diagnoses:   Syncope, unspecified syncope type   Sprain of right knee, unspecified ligament, initial encounter        Disposition:  ED Disposition     ED Disposition Condition Comment    Discharge Stable             This medical record created using voice recognition software and may contain unintended errors.    Documentation assistance provided by Yandy Ott acting as scribe for Ted Villanueva MD. Information recorded by the scribe was done at my direction and has been verified and validated by me.      Yandy Ott  01/08/22 0052       Ted Villanueva MD  01/08/22 0628

## 2022-01-10 ENCOUNTER — TELEPHONE (OUTPATIENT)
Dept: FAMILY MEDICINE CLINIC | Facility: CLINIC | Age: 22
End: 2022-01-10

## 2022-01-10 DIAGNOSIS — M25.571 ACUTE RIGHT ANKLE PAIN: ICD-10-CM

## 2022-01-10 DIAGNOSIS — R55 SYNCOPE, UNSPECIFIED SYNCOPE TYPE: Primary | ICD-10-CM

## 2022-01-10 NOTE — TELEPHONE ENCOUNTER
Caller: FARIBA FERREIRA    Relationship: Mother    Best call back number: [phone]      What is the medical concern/diagnosis:     What specialty or service is being requested:  ORTHOPEDIC    What is the provider, practice or medical service name: DR RIVERA       What is the office location:  [address] [place] [address]     What is the office phone number:     Any additional details:    PATIENT'S MOTHER IS REQUESTING A REFERRAL FOR THE ORTHOPEDIC DOCTOR. SHE IS REQUESTING A CALL ONCE THIS IS DONE. PLEASE CALL AND ADVISE

## 2022-01-10 NOTE — TELEPHONE ENCOUNTER
Caller: FARIBA FERREIRA    Relationship: Mother    Best call back number: [phone]    What is the medical concern/diagnosis:    What specialty or service is being requested:     CARDIOLOGY    What is the provider, practice or medical service name:  DR SANTANA      What is the office location:    What is the office phone number:    Any additional details:     PATIENT'S MOTHER IS NEEDING A REFERRAL TO THE CARDIOLOGIST DUE TO THE PATIENT PASSING OUT. SHE IS REQUESTING PCP TO DO THIS IMMEDIATELY AND CALL ONCE THIS IS DONE.

## 2022-01-11 NOTE — TELEPHONE ENCOUNTER
Spoke with cardio and it was a misreading, patient mother is calling to get a referral for her son.     Spoke with patients mother as well informed her to call her insurance and make Alec PCP on insurance, so patient does not have to pay for visits out of pocket and she voiced understanding.

## 2022-01-11 NOTE — TELEPHONE ENCOUNTER
Cardio department called because this encounter says it for the mother, but the referral was placed for the patient.     Also if the patient wants to continue to come to the office they will need to call and get their passport card updates. They have the wrong PCP listed on there. Without updating the claims can be denied and they will have to pay out of pocket.     Calling patient to verify all this information

## 2022-01-12 ENCOUNTER — OFFICE VISIT (OUTPATIENT)
Dept: CARDIOLOGY | Facility: CLINIC | Age: 22
End: 2022-01-12

## 2022-01-12 ENCOUNTER — OFFICE VISIT (OUTPATIENT)
Dept: ORTHOPEDIC SURGERY | Facility: CLINIC | Age: 22
End: 2022-01-12

## 2022-01-12 VITALS — OXYGEN SATURATION: 98 % | WEIGHT: 173 LBS | HEART RATE: 74 BPM | BODY MASS INDEX: 24.22 KG/M2 | HEIGHT: 71 IN

## 2022-01-12 VITALS
HEART RATE: 62 BPM | SYSTOLIC BLOOD PRESSURE: 120 MMHG | DIASTOLIC BLOOD PRESSURE: 60 MMHG | BODY MASS INDEX: 23.8 KG/M2 | HEIGHT: 71 IN | WEIGHT: 170 LBS

## 2022-01-12 DIAGNOSIS — R55 SYNCOPE AND COLLAPSE: Primary | ICD-10-CM

## 2022-01-12 DIAGNOSIS — S83.91XA SPRAIN OF RIGHT KNEE, UNSPECIFIED LIGAMENT, INITIAL ENCOUNTER: Primary | ICD-10-CM

## 2022-01-12 PROCEDURE — 99203 OFFICE O/P NEW LOW 30 MIN: CPT | Performed by: INTERNAL MEDICINE

## 2022-01-12 PROCEDURE — 99203 OFFICE O/P NEW LOW 30 MIN: CPT | Performed by: ORTHOPAEDIC SURGERY

## 2022-01-12 RX ORDER — DICLOFENAC SODIUM 75 MG/1
75 TABLET, DELAYED RELEASE ORAL 2 TIMES DAILY
Qty: 60 TABLET | Refills: 0 | Status: SHIPPED | OUTPATIENT
Start: 2022-01-12 | End: 2022-10-12

## 2022-01-12 NOTE — PATIENT INSTRUCTIONS
Vasovagal Syncope, Pediatric       Syncope, also called fainting or passing out, is a temporary loss of consciousness. It occurs when the blood flow to the brain is reduced. Vasovagal syncope, which is also called neurocardiogenic syncope, is a fainting spell in which the blood flow to the brain is reduced because of a sudden drop in heart rate and blood pressure.  Vasovagal syncope often occurs in response to fear or some other type of emotional or physical stress.. This type of fainting spell is generally considered harmless. However, injuries can occur if a child falls during a fainting spell.  What are the causes?  This condition is caused by a sudden decrease in blood pressure and heart rate, usually in response to a trigger. Many factors and situations can trigger an episode. Some common triggers include:  · Pain.  · Fear.  · Seeing blood. This may occur during medical procedures, such as when blood is being drawn from a vein.  · Common activities, such as coughing, swallowing, stretching, or going to the bathroom.  · Emotional stress.  · Being in a confined space.  · Standing for a long time, especially in a warm environment.  · Lack of sleep or rest.  · Not eating for a long time.  · Not drinking enough liquids.  · Recent illness.  · Using drugs that affect blood pressure, such as alcohol, marijuana, cocaine, opiates, or inhalants.  What are the signs or symptoms?  Before the fainting episode, your child may:  · Feel dizzy or light-headed.  · Become pale.  · Sense that he or she is going to faint.  · Feel like the room is spinning.  · Only see directly ahead (tunnel vision).  · Feel nauseous.  · See spots or slowly lose vision.  · Hear ringing in the ears.  · Have a headache.  · Feel warm and sweaty.  · Feel a sensation of pins and needles.  During the fainting spell, your child will generally be unconscious for no longer than a couple minutes before waking up and returning to normal. Getting up too  quickly before his or her body can recover can cause your child to faint again. Some twitching or jerky movements may occur during the fainting spell.  How is this diagnosed?  Your child's health care provider will ask about your child's symptoms, take a medical history, and perform a physical exam. Most times, your child's health care provider will make a diagnosis based on your explanation of the event plus an electrocardiogram (ECG). Other tests may be done to rule out other causes. These may include:  · Blood tests.  · Other tests to check the heart, such as:  ? Echocardiogram.  ? Holter monitor. This is a wearable device that performs a prolonged ECG that monitors your child's heart over days to weeks.  ? Electrophysiology study. This tests the electrical activity of the heart to find the cause of an abnormal heart rhythm (arrhythmia)  · A test to check the response of your child's body to changes in position (tilt table test). This may be done when other causes have been ruled out.  How is this treated?  Most cases of this condition do not require treatment. Your child's health care provider may recommend ways to help your child to avoid fainting triggers and may provide home strategies to prevent fainting. These may include having your child:  · Drink additional fluids if he or she is exposed to a possible trigger.  · Add more salt to his or her diet.  · Sit or lie down if he or she has warning signs of an oncoming episode.  · Perform certain exercises.  · Wear compression stockings.  If your child's fainting spells continue, he or she may be given medicines to help reduce further episodes of fainting. In some cases, surgery to place a pacemaker is done, but this is rare.  Follow these instructions at home:  Eating and drinking  · Have your child eat regular meals and avoid skipping meals.  · Have your child drink enough fluid to keep his or her urine clear or pale yellow.  · Have your child avoid  caffeine.  · Increase salt in your child's diet as told by your child's health care provider.  Lifestyle  · Have your child avoid hot tubs and saunas.  · Try to make sure that your child gets enough sleep at night.  General instructions  · Teach your child to identify the warning signs of vasovagal syncope.  · Have your child sit or lie down at the first warning sign of a fainting spell. If sitting, your child should put his or her head down between his or her legs. If lying down, your child should swing his or her legs up in the air to increase blood flow to the brain.  · Tell your child to avoid prolonged standing. If your child has to stand for a long time, he or she should do movements such as:  ? Crossing his or her legs.  ? Flexing and stretching his or her leg muscles.  ? Squatting.  ? Moving his or her legs.  · Give over-the-counter and prescription medicines only as told by your child's health care provider.  · Keep all follow-up visits as told by your child's health care provider. This is important.  Get help right away if:  · Your child faints.  · Your child hits his or her head or is injured after fainting.  · Your child has chest pain or shortness of breath.  · Your child has a racing or irregular heartbeat (palpitations).  Summary  · Syncope, also called fainting or passing out, is a temporary loss of consciousness.  · This condition is caused by a sudden decrease in blood pressure and heart rate, usually in response to a trigger, such as pain, fear, or illness.  · Most cases of this condition do not require treatment.  This information is not intended to replace advice given to you by your health care provider. Make sure you discuss any questions you have with your health care provider.  Document Revised: 05/05/2021 Document Reviewed: 05/05/2021  Elsevier Patient Education © 2021 [employer]

## 2022-01-12 NOTE — ASSESSMENT & PLAN NOTE
Patient with recurrent episodes consistent with vasovagal syncope but no notable provoking factors.  Gave patient handout regarding vasovagal syncope as well as counseling him on fluid hydration, compression stockings, and exercises to help abort spells.  Also with any recurrent symptoms recommended setting and elevating his legs to help terminate.  In addition we will get a echocardiogram, tilt test and Holter monitor

## 2022-01-12 NOTE — PROGRESS NOTES
"Chief Complaint  Pain of the Right Knee     Subjective      Alfonso Randolph II presents to [facility] for an evaluation of right knee. He injured his knee on 1/7/21. He reports he had felt hot and dizzy on the way to the bathroom and passed out. He injured his right knee in the process. He reports immediate swelling. He presented himself to the ED and they placed him into a knee immobilizer and told him to follow-up with orthopedics.     Allergies   Allergen Reactions   • Amoxicillin Rash   • Nystatin Rash   • Penicillins Rash        Social History     Socioeconomic History   • Marital status: Single   Tobacco Use   • Smoking status: Current Every Day Smoker     Packs/day: 0.50     Types: Cigarettes   • Smokeless tobacco: Never Used   Vaping Use   • Vaping Use: Some days   • Substances: Nicotine, Flavoring   • Devices: Disposable   Substance and Sexual Activity   • Alcohol use: Yes     Comment: OCC   • Drug use: Never   • Sexual activity: Defer        Review of Systems     Objective   Vital Signs:   Pulse 74   Ht 180.3 cm (71\")   Wt 78.5 kg (173 lb)   SpO2 98%   BMI 24.13 kg/m²       Physical Exam  Constitutional:       Appearance: Normal appearance. Patient is well-developed and normal weight.   HENT:      Head: Normocephalic.      Right Ear: Hearing and external ear normal.      Left Ear: Hearing and external ear normal.      Nose: Nose normal.   Eyes:      Conjunctiva/sclera: Conjunctivae normal.   Cardiovascular:      Rate and Rhythm: Normal rate.   Pulmonary:      Effort: Pulmonary effort is normal.      Breath sounds: No wheezing or rales.   Abdominal:      Palpations: Abdomen is soft.      Tenderness: There is no abdominal tenderness.   Musculoskeletal:      Cervical back: Normal range of motion.   Skin:     Findings: No rash.   Neurological:      Mental Status: Patient  is alert and oriented to person, place, and time.   Psychiatric:         Mood and Affect: Mood and affect " normal.         Judgment: Judgment normal.       Ortho Exam      RIGHT KNEE: Swelling. Tenderness about the knee. Hamstring tenderness. Calf supple, non-tender, no signs of DVT. Dorsal Pedal Pulse 2+, posterior tibialis pulse 2+. Sensation grossly intact. Neurovascular intact. -2 degrees of extension. Flexion to 120 degrees. Limping gait.       Procedures        Imaging Results (Most Recent)     None           Result Review :         XR Knee 3 View Right    Result Date: 1/8/2022  Narrative: PROCEDURE: XR KNEE 3 VW RIGHT  COMPARISON: [facility], CR, KNEE 3 VIEWS RT, 8/03/2012, 13:45.  INDICATIONS: FALL. RIGHT KNEE PAIN. NON WEIGHT BEARING  FINDINGS: There is no evidence for displaced fracture or dislocation. No definitive joint effusion is observed. No focal osseous abnormalities are seen. The soft tissues are unremarkable.      Impression:  No evidence for displaced fracture or dislocation.      SYLVESTER SALGADO MD       Electronically Signed and Approved By: SYLVESTER SALGADO MD on 1/08/2022 at 0:45                 Assessment and Plan     DX: Right knee sprain     Discussed treatment plans and diagnosis with the patient. Discussed weaning out of the knee immobilizer, he understands this. He is to work on flexion and extension. Anti-inflammatory prescribed today. If he is failing to improve, an MRI will be the next step.     Work note provided for the patient.     Call or return if worsening symptoms.    Follow Up     3-4 weeks.       Patient was given instructions and counseling regarding his condition or for health maintenance advice. Please see specific information pulled into the AVS if appropriate.     Scribed for Boo Tabares MD by Tameka Graff.  01/12/22   10:48 EST    I have personally performed the services described in this document as scribed by the above individual and it is both accurate and complete. Boo Tabares MD 01/12/22

## 2022-01-12 NOTE — PROGRESS NOTES
Chief Complaint  Syncope      History of Present Illness  Alfonso Randolph II presents to [facility]  Patient is a 22-year-old who has had multiple syncopal episodes about once per year for the last several years most recently suffering a knee injury and seen in the emergency room.  The patient had gotten up to use the restroom felt hot had a ringing in his ears went to sit down on the bathtub and then next thing he knew he was regaining consciousness with knee pain.  The patient was noted to be diaphoretic.  Patient has some tonic-clonic like motions after episodes up to 15 to 20 seconds    Past Medical History:   Diagnosis Date   • Acid reflux    • Acid reflux    • Anxiety    • Depression    • Dorsal wrist ganglion 9/15/74139    RIGHT   • Seasonal allergies          Current Outpatient Medications:   •  citalopram (CeleXA) 20 MG tablet, Take 2 tablets by mouth Daily., Disp: 90 tablet, Rfl: 1  •  diclofenac (VOLTAREN) 75 MG EC tablet, Take 1 tablet by mouth 2 (Two) Times a Day., Disp: 60 tablet, Rfl: 0  •  Omeprazole Magnesium (PRILOSEC OTC PO), Take 10 mg by mouth As Needed., Disp: , Rfl:   •  ondansetron (ZOFRAN) 4 MG tablet, TAKE 1 TABLET BY MOUTH EVERY 8 HOURS AS NEEDED FOR NAUSEA FOR VOMITING, Disp: 20 tablet, Rfl: 0  •  diflunisal (DOLOBID) 500 MG tablet tablet, Take 1 tablet by mouth 2 (Two) Times a Day., Disp: 60 tablet, Rfl: 0  •  Lidocaine Viscous HCl (XYLOCAINE) 2 % solution, Take 10 mL by mouth As Needed for Mild Pain ., Disp: 100 mL, Rfl: 0    There are no discontinued medications.  Allergies   Allergen Reactions   • Amoxicillin Rash   • Nystatin Rash   • Penicillins Rash        Social History     Tobacco Use   • Smoking status: Current Every Day Smoker     Packs/day: 0.50     Types: Cigarettes   • Smokeless tobacco: Never Used   Vaping Use   • Vaping Use: Some days   • Substances: Nicotine, Flavoring   • Devices: Disposable   Substance Use Topics   • Alcohol use: Yes      "Comment: OCC   • Drug use: Never       Family History   Problem Relation Age of Onset   • No Known Problems Mother    • No Known Problems Father         Objective     /60 (Patient Position: Sitting)   Pulse 62   Ht 180.3 cm (71\")   Wt 77.1 kg (170 lb)   BMI 23.71 kg/m²       Physical Exam    General Appearance:   · no acute distress  · Alert and oriented x3  HENT:   · lips not cyanotic  · Atraumatic  Neck:  · No jvd   · supple  Respiratory:  · no respiratory distress  · normal breath sounds  · no rales  Cardiovascular:  · Regular rate and rhythm  · no S3, no S4   · no murmur  · no rub  Extremities  · No cyanosis  · lower extremity edema: none    Skin:   · warm, dry  · No rashes    Result Review :     No results found for: PROBNP  CMP    CMP 1/8/22   Glucose 104 (A)   BUN 7   Creatinine 0.96   eGFR Non African Am 96   Sodium 143   Potassium 3.5   Chloride 103   Calcium 9.6   Albumin 4.60   Total Bilirubin 0.3   Alkaline Phosphatase 55   AST (SGOT) 14   ALT (SGPT) 8   (A) Abnormal value            CBC w/diff    CBC w/Diff 1/8/22   WBC 6.87   RBC 4.59   Hemoglobin 14.7   Hematocrit 42.1   MCV 91.7   MCH 32.0   MCHC 34.9   RDW 12.6   Platelets 170   Neutrophil Rel % 55.7   Immature Granulocyte Rel % 0.1   Lymphocyte Rel % 35.2   Monocyte Rel % 5.2   Eosinophil Rel % 2.6   Basophil Rel % 1.2                       No results found for this or any previous visit.             The ASCVD Risk score (Barbi ABISAI Jr., et al., 2013) failed to calculate for the following reasons:    The 2013 ASCVD risk score is only valid for ages 40 to 79     Diagnoses and all orders for this visit:    1. Syncope and collapse (Primary)  Assessment & Plan:  Patient with recurrent episodes consistent with vasovagal syncope but no notable provoking factors.  Gave patient handout regarding vasovagal syncope as well as counseling him on fluid hydration, compression stockings, and exercises to help abort spells.  Also with any recurrent symptoms " recommended setting and elevating his legs to help terminate.  In addition we will get a echocardiogram, tilt test and treadmill stress test    Orders:  -     Adult Transthoracic Echo Complete W/ Cont if Necessary Per Protocol; Future  -     Holter Monitor - 24 Hour; Future  -     Tilt Table; Future          Follow Up     No follow-ups on file.          Patient was given instructions and counseling regarding his condition or for health maintenance advice. Please see specific information pulled into the AVS if appropriate.

## 2022-01-14 ENCOUNTER — OFFICE VISIT (OUTPATIENT)
Dept: FAMILY MEDICINE CLINIC | Facility: CLINIC | Age: 22
End: 2022-01-14

## 2022-01-14 VITALS
OXYGEN SATURATION: 98 % | TEMPERATURE: 99.3 F | SYSTOLIC BLOOD PRESSURE: 114 MMHG | BODY MASS INDEX: 23.63 KG/M2 | HEART RATE: 84 BPM | WEIGHT: 168.8 LBS | DIASTOLIC BLOOD PRESSURE: 62 MMHG | HEIGHT: 71 IN

## 2022-01-14 DIAGNOSIS — R55 SYNCOPE, UNSPECIFIED SYNCOPE TYPE: ICD-10-CM

## 2022-01-14 DIAGNOSIS — M25.561 ACUTE PAIN OF RIGHT KNEE: ICD-10-CM

## 2022-01-14 DIAGNOSIS — K64.9 HEMORRHOIDS, UNSPECIFIED HEMORRHOID TYPE: Primary | ICD-10-CM

## 2022-01-14 PROBLEM — M67.40 GANGLION CYST: Status: ACTIVE | Noted: 2017-09-15

## 2022-01-14 PROCEDURE — 99213 OFFICE O/P EST LOW 20 MIN: CPT | Performed by: NURSE PRACTITIONER

## 2022-01-14 RX ORDER — HYDROCORTISONE 25 MG/G
CREAM TOPICAL 2 TIMES DAILY
Qty: 28 G | Refills: 0 | Status: SHIPPED | OUTPATIENT
Start: 2022-01-14 | End: 2022-10-12

## 2022-01-14 NOTE — PROGRESS NOTES
"Chief Complaint  Follow-up (ER)    Subjective          Alfonso Randolph II presents to [facility]  Presents to the office today regarding a recent emergency department visit.  Patient states that he did have a syncopal episode and when he woke up he had right knee pain.  Patient has followed up with orthopedics regarding his knee as well as cardiology for the syncopal episode.  Patient is scheduled for a tilt table as well as Holter monitor.  She denies any chest pain shortness of breath or palpitations.  He denies any other episodes of syncope.  He does state that he had been drinking states that it was not in excess.  Patient also states that he did have orthostatic hypotension.  I did encourage the patient to increase his water intake.  Patient does state that he has been having rectal pain and eating with wiping.  He does state he has a history of hemorrhoids.      Objective   Vital Signs:   /62 (BP Location: Right arm, Patient Position: Sitting, Cuff Size: Adult)   Pulse 84   Temp 99.3 °F (37.4 °C) (Temporal)   Ht 180.3 cm (71\")   Wt 76.6 kg (168 lb 12.8 oz)   SpO2 98%   BMI 23.54 kg/m²     Physical Exam  Vitals reviewed.   Constitutional:       Appearance: Normal appearance.   Cardiovascular:      Rate and Rhythm: Normal rate and regular rhythm.      Heart sounds: Normal heart sounds, S1 normal and S2 normal. No murmur heard.      Pulmonary:      Effort: Pulmonary effort is normal. No respiratory distress.      Breath sounds: Normal breath sounds.   Genitourinary:     Rectum: External hemorrhoid present.   Skin:     General: Skin is warm and dry.   Neurological:      Mental Status: He is alert and oriented to person, place, and time.   Psychiatric:         Attention and Perception: Attention normal.         Mood and Affect: Mood normal.         Behavior: Behavior normal.        Result Review :                 Assessment and Plan    Diagnoses and all orders for this " visit:    1. Hemorrhoids, unspecified hemorrhoid type (Primary)  -     Hydrocortisone, Perianal, (Proctozone-HC) 2.5 % rectal cream; Insert  into the rectum 2 (Two) Times a Day.  Dispense: 28 g; Refill: 0    2. Acute pain of right knee    3. Syncope, unspecified syncope type        Follow Up   Return if symptoms worsen or fail to improve.  Patient was given instructions and counseling regarding his condition or for health maintenance advice. Please see specific information pulled into the AVS if appropriate.

## 2022-01-29 ENCOUNTER — HOSPITAL ENCOUNTER (EMERGENCY)
Facility: HOSPITAL | Age: 22
Discharge: HOME | End: 2022-01-29
Attending: EMERGENCY MEDICINE | Admitting: EMERGENCY MEDICINE

## 2022-01-29 VITALS
HEART RATE: 66 BPM | WEIGHT: 166.01 LBS | HEIGHT: 71 IN | BODY MASS INDEX: 23.24 KG/M2 | RESPIRATION RATE: 16 BRPM | OXYGEN SATURATION: 99 % | DIASTOLIC BLOOD PRESSURE: 69 MMHG | SYSTOLIC BLOOD PRESSURE: 108 MMHG | TEMPERATURE: 98.7 F

## 2022-01-29 DIAGNOSIS — Z20.822 ENCOUNTER FOR SCREENING LABORATORY TESTING FOR COVID-19 VIRUS: Primary | ICD-10-CM

## 2022-01-29 DIAGNOSIS — B34.9 VIRAL SYNDROME: ICD-10-CM

## 2022-01-29 LAB
FLUAV AG NPH QL: NEGATIVE
FLUBV AG NPH QL IA: NEGATIVE
S PYO AG THROAT QL: NEGATIVE
SARS-COV-2 RNA PNL SPEC NAA+PROBE: DETECTED

## 2022-01-29 PROCEDURE — 87081 CULTURE SCREEN ONLY: CPT | Performed by: PHYSICIAN ASSISTANT

## 2022-01-29 PROCEDURE — 87804 INFLUENZA ASSAY W/OPTIC: CPT | Performed by: PHYSICIAN ASSISTANT

## 2022-01-29 PROCEDURE — 99283 EMERGENCY DEPT VISIT LOW MDM: CPT

## 2022-01-29 PROCEDURE — U0004 COV-19 TEST NON-CDC HGH THRU: HCPCS | Performed by: PHYSICIAN ASSISTANT

## 2022-01-29 PROCEDURE — C9803 HOPD COVID-19 SPEC COLLECT: HCPCS

## 2022-01-29 PROCEDURE — 87880 STREP A ASSAY W/OPTIC: CPT | Performed by: PHYSICIAN ASSISTANT

## 2022-01-31 LAB — BACTERIA SPEC AEROBE CULT: NORMAL

## 2022-02-15 ENCOUNTER — PATIENT MESSAGE (OUTPATIENT)
Dept: CARDIOLOGY | Facility: CLINIC | Age: 22
End: 2022-02-15

## 2022-03-08 ENCOUNTER — HOSPITAL ENCOUNTER (OUTPATIENT)
Dept: CARDIOLOGY | Facility: HOSPITAL | Age: 22
Discharge: HOME | End: 2022-03-08

## 2022-03-08 DIAGNOSIS — R55 SYNCOPE AND COLLAPSE: ICD-10-CM

## 2022-03-14 ENCOUNTER — HOSPITAL ENCOUNTER (OUTPATIENT)
Dept: CARDIOLOGY | Facility: HOSPITAL | Age: 22
Discharge: HOME | End: 2022-03-14
Admitting: INTERNAL MEDICINE

## 2022-03-14 VITALS — BODY MASS INDEX: 23.03 KG/M2 | HEIGHT: 72 IN | WEIGHT: 170 LBS

## 2022-03-14 PROCEDURE — 93660 TILT TABLE EVALUATION: CPT

## 2022-03-14 PROCEDURE — 93660 TILT TABLE EVALUATION: CPT | Performed by: INTERNAL MEDICINE

## 2022-03-14 NOTE — DISCHARGE INSTRUCTIONS
Cardiovascular Services Phone Number: [phone]    Normal activities may be resumed after you are released from the hospital.  Normal consumption of foods and liquids may be resumed immediately after the study.  Contact your physician who directed your tilt table study if you experience any symptoms again.  Resume your medications, following your doctor's instructions.  Notify your doctor if you experience your symptoms while taking your medications  Do not stop taking your medication without notifying your doctor.    In the event of an emergency, call 911 or go to your nearest emergency room.

## 2022-03-20 LAB
MAXIMAL PREDICTED HEART RATE: 196 BPM
STRESS TARGET HR: 167 BPM

## 2022-03-21 ENCOUNTER — TELEPHONE (OUTPATIENT)
Dept: CARDIOLOGY | Facility: CLINIC | Age: 22
End: 2022-03-21

## 2022-03-21 NOTE — TELEPHONE ENCOUNTER
----- Message from MATTHEW Morales sent at 3/21/2022  8:20 AM EDT -----  Notify pt tilt table test: Negative for any evidence of vasovagal syncope, can follow up in 6 months

## 2022-06-05 DIAGNOSIS — F33.0 MAJOR DEPRESSIVE DISORDER, RECURRENT, MILD: ICD-10-CM

## 2022-06-06 RX ORDER — CITALOPRAM 20 MG/1
40 TABLET ORAL DAILY
Qty: 90 TABLET | Refills: 1 | Status: SHIPPED | OUTPATIENT
Start: 2022-06-06 | End: 2022-11-22 | Stop reason: SDUPTHER

## 2022-10-12 ENCOUNTER — OFFICE VISIT (OUTPATIENT)
Dept: CARDIOLOGY | Facility: CLINIC | Age: 22
End: 2022-10-12

## 2022-10-12 VITALS
HEIGHT: 72 IN | BODY MASS INDEX: 21.21 KG/M2 | HEART RATE: 62 BPM | WEIGHT: 156.6 LBS | SYSTOLIC BLOOD PRESSURE: 119 MMHG | DIASTOLIC BLOOD PRESSURE: 62 MMHG

## 2022-10-12 DIAGNOSIS — R55 SYNCOPE AND COLLAPSE: Primary | ICD-10-CM

## 2022-10-12 PROCEDURE — 99213 OFFICE O/P EST LOW 20 MIN: CPT | Performed by: INTERNAL MEDICINE

## 2022-10-12 NOTE — PROGRESS NOTES
"Chief Complaint  syncope and collapse and Follow-up    Subjective    Patient is doing well has had some orthostatic symptoms with standing but no syncopal episodes  Past Medical History:   Diagnosis Date   • Acid reflux    • Acid reflux    • Anxiety    • Depression    • Dorsal wrist ganglion 9/15/56171    RIGHT   • Seasonal allergies    • Syncope          Current Outpatient Medications:   •  citalopram (CeleXA) 20 MG tablet, Take 2 tablets by mouth Daily., Disp: 90 tablet, Rfl: 1  •  Omeprazole Magnesium (PRILOSEC OTC PO), Take 10 mg by mouth As Needed., Disp: , Rfl:   •  ondansetron (ZOFRAN) 4 MG tablet, TAKE 1 TABLET BY MOUTH EVERY 8 HOURS AS NEEDED FOR NAUSEA FOR VOMITING, Disp: 20 tablet, Rfl: 0    Medications Discontinued During This Encounter   Medication Reason   • diclofenac (VOLTAREN) 75 MG EC tablet *Therapy completed   • diflunisal (DOLOBID) 500 MG tablet tablet *Therapy completed   • Hydrocortisone, Perianal, (Proctozone-HC) 2.5 % rectal cream *Therapy completed   • Lidocaine Viscous HCl (XYLOCAINE) 2 % solution *Therapy completed     Allergies   Allergen Reactions   • Amoxicillin Rash   • Nystatin Rash   • Penicillins Rash        Social History     Tobacco Use   • Smoking status: Every Day     Packs/day: 0.50     Types: Cigarettes   • Smokeless tobacco: Never   Vaping Use   • Vaping Use: Some days   • Substances: Nicotine, Flavoring   • Devices: Disposable   Substance Use Topics   • Alcohol use: Not Currently     Comment: OCC   • Drug use: Never       Family History   Problem Relation Age of Onset   • No Known Problems Mother    • No Known Problems Father    • Heart disease Maternal Grandmother    • Heart failure Paternal Grandmother    • Heart disease Paternal Grandmother         Objective     /62   Pulse 62   Ht 182.9 cm (72\")   Wt 71 kg (156 lb 9.6 oz)   BMI 21.24 kg/m²       Physical Exam    General Appearance:   · no acute distress  · Alert and oriented x3  HENT:   · lips not " cyanotic  · Atraumatic  Neck:  · No jvd   · supple  Respiratory:  · no respiratory distress  · normal breath sounds  · no rales  Cardiovascular:  · Regular rate and rhythm  · no S3, no S4   · no murmur  · no rub  Extremities  · No cyanosis  · lower extremity edema: none    Skin:   · warm, dry  · No rashes      Result Review :     No results found for: PROBNP  CMP    CMP 1/8/22   Glucose 104 (A)   BUN 7   Creatinine 0.96   eGFR Non African Am 96   Sodium 143   Potassium 3.5   Chloride 103   Calcium 9.6   Albumin 4.60   Total Bilirubin 0.3   Alkaline Phosphatase 55   AST (SGOT) 14   ALT (SGPT) 8   (A) Abnormal value            CBC w/diff    CBC w/Diff 1/8/22   WBC 6.87   RBC 4.59   Hemoglobin 14.7   Hematocrit 42.1   MCV 91.7   MCH 32.0   MCHC 34.9   RDW 12.6   Platelets 170   Neutrophil Rel % 55.7   Immature Granulocyte Rel % 0.1   Lymphocyte Rel % 35.2   Monocyte Rel % 5.2   Eosinophil Rel % 2.6   Basophil Rel % 1.2            No results found for: TSH   No results found for: FREET4   No results found for: DDIMERQUANT  No results found for: MG   No results found for: DIGOXIN   No results found for: TROPONINT          No results found for: POCTROP    Results for orders placed in visit on 02/08/22    Adult Transthoracic Echo Complete W/ Cont if Necessary Per Protocol    Interpretation Summary  · Calculated left ventricular EF = 62% Estimated left ventricular EF was in agreement with the calculated left ventricular EF.  · Estimated right ventricular systolic pressure from tricuspid regurgitation is normal (<35 mmHg).                 Diagnoses and all orders for this visit:    1. Syncope and collapse (Primary)  Assessment & Plan:  Suspect vasovagal work-up was negative counseled patient on fluid hydration compression socks and countermeasures if recurrent issues could consider low-dose of Florinef patient can follow-up as needed            Follow Up     Return in about 1 year (around 10/12/2023).          Patient was  given instructions and counseling regarding his condition or for health maintenance advice. Please see specific information pulled into the AVS if appropriate.

## 2022-10-12 NOTE — ASSESSMENT & PLAN NOTE
Suspect vasovagal work-up was negative counseled patient on fluid hydration compression socks and countermeasures if recurrent issues could consider low-dose of Florinef patient can follow-up as needed

## 2022-11-22 DIAGNOSIS — F33.0 MAJOR DEPRESSIVE DISORDER, RECURRENT, MILD: ICD-10-CM

## 2022-11-22 RX ORDER — CITALOPRAM 20 MG/1
40 TABLET ORAL DAILY
Qty: 90 TABLET | Refills: 1 | Status: SHIPPED | OUTPATIENT
Start: 2022-11-22

## 2023-03-06 DIAGNOSIS — F33.0 MAJOR DEPRESSIVE DISORDER, RECURRENT, MILD: ICD-10-CM

## 2023-03-06 RX ORDER — CITALOPRAM 20 MG/1
40 TABLET ORAL DAILY
Qty: 90 TABLET | Refills: 1 | OUTPATIENT
Start: 2023-03-06